# Patient Record
Sex: MALE | Race: WHITE
[De-identification: names, ages, dates, MRNs, and addresses within clinical notes are randomized per-mention and may not be internally consistent; named-entity substitution may affect disease eponyms.]

---

## 2020-10-31 ENCOUNTER — HOSPITAL ENCOUNTER (EMERGENCY)
Dept: HOSPITAL 62 - ER | Age: 60
Discharge: HOME | End: 2020-10-31
Payer: MEDICARE

## 2020-10-31 VITALS — SYSTOLIC BLOOD PRESSURE: 131 MMHG | DIASTOLIC BLOOD PRESSURE: 79 MMHG

## 2020-10-31 DIAGNOSIS — R50.9: ICD-10-CM

## 2020-10-31 DIAGNOSIS — Z20.828: ICD-10-CM

## 2020-10-31 DIAGNOSIS — R51.9: ICD-10-CM

## 2020-10-31 DIAGNOSIS — F17.200: ICD-10-CM

## 2020-10-31 DIAGNOSIS — M79.10: Primary | ICD-10-CM

## 2020-10-31 DIAGNOSIS — I10: ICD-10-CM

## 2020-10-31 LAB
A TYPE INFLUENZA AG: NEGATIVE
APPEARANCE UR: CLEAR
APTT PPP: YELLOW S
B INFLUENZA AG: NEGATIVE
BILIRUB UR QL STRIP: NEGATIVE
GLUCOSE UR STRIP-MCNC: NEGATIVE MG/DL
KETONES UR STRIP-MCNC: NEGATIVE MG/DL
NITRITE UR QL STRIP: NEGATIVE
PH UR STRIP: 5 [PH] (ref 5–9)
PROT UR STRIP-MCNC: NEGATIVE MG/DL
SP GR UR STRIP: 1.02
UROBILINOGEN UR-MCNC: NEGATIVE MG/DL (ref ?–2)

## 2020-10-31 PROCEDURE — 99284 EMERGENCY DEPT VISIT MOD MDM: CPT

## 2020-10-31 PROCEDURE — 87804 INFLUENZA ASSAY W/OPTIC: CPT

## 2020-10-31 PROCEDURE — C9803 HOPD COVID-19 SPEC COLLECT: HCPCS

## 2020-10-31 PROCEDURE — 81001 URINALYSIS AUTO W/SCOPE: CPT

## 2020-10-31 PROCEDURE — 87635 SARS-COV-2 COVID-19 AMP PRB: CPT

## 2020-10-31 PROCEDURE — 87070 CULTURE OTHR SPECIMN AEROBIC: CPT

## 2020-10-31 PROCEDURE — 87880 STREP A ASSAY W/OPTIC: CPT

## 2020-10-31 PROCEDURE — 71045 X-RAY EXAM CHEST 1 VIEW: CPT

## 2020-10-31 NOTE — RADIOLOGY REPORT (SQ)
EXAM DESCRIPTION: 



XR CHEST 1 VIEW



COMPLETED DATE/TME:  10/31/2020 19:27



CLINICAL HISTORY: 



60 years, Male, #1 SYNCOPE



COMPARISON:

None.



NUMBER OF VIEWS:

1



TECHNIQUE:

Portable AP upright view of the chest was obtained at 8:11 PM.



LIMITATIONS:

None.



FINDINGS:



Heart size is normal.  Lungs appear clear.  There is no evidence

of pleural effusion or pneumothorax.  No definite acute bony

abnormality is seen.



IMPRESSION:



No acute abnormality as above.

 



copyright 2011 [employer]- All Rights Reserved

## 2020-10-31 NOTE — ER DOCUMENT REPORT
ED Medical Screen (RME)





- General


Chief Complaint: Pain All Over


Stated Complaint: FEVER/MUSCLE PAINS/HEADACHE


Time Seen by Provider: 10/31/20 19:27


Mode of Arrival: Ambulatory


Information source: Patient


Notes: 





60-year-old male presented to ED for complaint of body aches all over that 

started yesterday then today he had a fever of over 101 at 4 PM took 4 Tylenol 

and got it down.  He is afebrile at this time.  He states he still has the body 

aches all over.  I have ordered flu strep chest x-ray urine and Covid test.











The patient was evaluated during the global Covid 19 pandemic, and that 

diagnosis was suspected/considered upon their initial presentation.  Their 

evaluation, treatment and testing was consistent with current guidelines for 

patients who present with complaints or symptoms that may be related to Covid 

19.

















I have greeted and performed a rapid initial assessment of this patient.  A 

comprehensive ED assessment and evaluation of the patient, analysis of test 

results and completion of medical decision making process will be conducted by 

an additional ED providers.

## 2020-10-31 NOTE — ER DOCUMENT REPORT
HPI





- HPI


Patient complains to provider of: Body aches


Time Seen by Provider: 10/31/20 19:27


Onset: Yesterday


Onset/Duration: Gradual


Quality of pain: Achy


Pain Level: 1


Context: 





Patient presents complaining of generalized body aches that started yesterday.  

Patient reports fever today of 101.  Patient does complain of mild headache 

pain.  Patient denies any cough.  Patient denies any sore throat.


Associated Symptoms: Body/muscle aches, Fever.  denies: Chest pain, 

Nonproductive cough, Productive cough, Earache, Nausea, Rhinnorhea


Exacerbated by: Denies


Relieved by: Denies


Similar symptoms previously: No


Recently seen / treated by doctor: No





- ROS


ROS below otherwise negative: Yes


Systems Reviewed and Negative: Yes All other systems reviewed and negative





- CONSTITUTIONAL


Constitutional: REPORTS: Fever.  DENIES: Chills





- EENT


EENT: DENIES: Sore Throat, Congestion





- NEURO


Neurology: DENIES: Headache





- CARDIOVASCULAR


Cardiovascular: DENIES: Chest pain





- RESPIRATORY


Respiratory: DENIES: Coughing





- GASTROINTESTINAL


Gastrointestinal: DENIES: Abdominal Pain, Nausea, Patient vomiting, Diarrhea





- MUSCULOSKELETAL


Notes: 





Generalized body aches





- DERM


Skin Color: Normal


Skin Problems: None





Past Medical History





- General


Information source: Patient





- Social History


Smoking Status: Current Every Day Smoker


Chew tobacco use (# tins/day): No


Frequency of alcohol use: Daily 3 beers


Drug Abuse: None


Occupation: None


Family History: Reviewed & Not Pertinent


Patient has homicidal ideation: No





- Past Medical History


Cardiac Medical History: Reports: Hx Hypertension


GI Medical History: Reports: Hx Gastroesophageal Reflux Disease


Past Surgical History: Reports: Hx Orthopedic Surgery - Multi back surg, c-spine





Vertical Provider Document





- CONSTITUTIONAL


Agree With Documented VS: Yes


Exam Limitations: No Limitations


General Appearance: WD/WN, No Apparent Distress


Notes: 





PHYSICAL EXAMINATION: 


GENERAL: Well-appearing and in no acute distress. 


HEAD: Atraumatic, normocephalic. 


EYES: sclera anicteric, conjunctiva are normal. 


ENT: nares patent. Moist mucous membranes. 


NECK: Normal range of motion, supple without lymphadenopathy, no meningismus


LUNGS: CTAB and equal. No wheezes rales or rhonchi. 


HEART: Regular rate and rhythm without murmurs 


EXTREMITIES: Normal range of motion, no pitting edema. 


BACK: No midline tenderness, no step-off or deformity. No CVA tenderness


NEUROLOGICAL: Cranial nerves grossly intact. Normal speech. Normal gait.


PSYCH: Normal mood, normal affect. 


SKIN: Warm, Dry, normal turgor, no rashes or lesions noted








Course





- Re-evaluation


Re-evalutation: 





10/31/20 21:23


Patient presents with generalized body aches that started yesterday with fever 

today.  Patient nontoxic in appearance and complains of tenderness that he 

describes as annoying although not severely painful to generalized muscles.  

Patient's strep and flu test negative at this time and no acute findings noted 

on chest x-ray, Covid test is pending at this time.  The patient was evaluated 

during the global Covid 19 pandemic, and that diagnosis was suspected/considered

upon their initial presentation.  Their evaluation, treatment and testing was 

consistent with current guidelines for patients who present with complaints or 

symptoms that may be related to Covid 19.





Patient presents with symptoms worrisome for possible Covid 19.  Patient does 

not have emergency worrying symptoms such as difficulty breathing, shortness of 

breath, chest pain, pressure, confusion or cyanosis.  Patient appears suitable 

for discharge as  vital signs are stable and patient is nontoxic in appearance. 

Good return precautions have been discussed with patient, patient verbalized 

understanding and is agreeable with discharge plan of care at this time.





- Vital Signs


Vital signs: 


                                        











Temp Pulse Resp BP Pulse Ox


 


 98.5 F   96   16   130/84 H  97 


 


 10/31/20 19:32  10/31/20 19:32  10/31/20 19:32  10/31/20 19:32  10/31/20 19:32














- Laboratory


Laboratory results interpreted by me: 


                                        











  10/31/20





  19:46


 


Urine Blood  MODERATE H











10/31/20 21:24


                              Labs- All tests 24 hr











  10/31/20 10/31/20 10/31/20





  19:46 19:46 19:46


 


Urine Color  YELLOW  


 


Urine Appearance  CLEAR  


 


Urine pH  5.0  


 


Ur Specific Gravity  1.017  


 


Urine Protein  NEGATIVE  


 


Urine Glucose (UA)  NEGATIVE  


 


Urine Ketones  NEGATIVE  


 


Urine Blood  MODERATE H  


 


Urine Nitrite  NEGATIVE  


 


Urine Bilirubin  NEGATIVE  


 


Urine Urobilinogen  NEGATIVE  


 


Ur Leukocyte Esterase  NEGATIVE  


 


Urine WBC (Auto)  1  


 


Urine RBC (Auto)  1  


 


Squamous Epi Cells Auto  <1  


 


Urine Mucus (Auto)  RARE  


 


Urine Ascorbic Acid  NEGATIVE  


 


COVID-19 Source   


 


Influenza A (Rapid)    NEGATIVE


 


Influenza B (Rapid)    NEGATIVE


 


Group A Strep Rapid   NEGATIVE 














  10/31/20





  19:46


 


Urine Color 


 


Urine Appearance 


 


Urine pH 


 


Ur Specific Gravity 


 


Urine Protein 


 


Urine Glucose (UA) 


 


Urine Ketones 


 


Urine Blood 


 


Urine Nitrite 


 


Urine Bilirubin 


 


Urine Urobilinogen 


 


Ur Leukocyte Esterase 


 


Urine WBC (Auto) 


 


Urine RBC (Auto) 


 


Squamous Epi Cells Auto 


 


Urine Mucus (Auto) 


 


Urine Ascorbic Acid 


 


COVID-19 Source  See comment


 


Influenza A (Rapid) 


 


Influenza B (Rapid) 


 


Group A Strep Rapid 














- Diagnostic Test


Radiology reviewed: Image reviewed, Reports reviewed





Discharge





- Discharge


Clinical Impression: 


 Encounter for screening laboratory testing for COVID-19 virus, Body aches





Headache


Qualifiers:


 Headache type: unspecified Headache chronicity pattern: unspecified pattern 

Intractability: not intractable Qualified Code(s): R51.9 - Headache, unspecified





Condition: Stable


Disposition: HOME, SELF-CARE


Instructions:  COVID-19 Guidance for Persons Under Investigation, Acetaminophen,

Headache ([facility]), Myalagia (Muscle Pain) ([facility])


Additional Instructions: 


Return immediately for any new or worsening symptoms: Persistent fever, 

worsening headache, any concerning new symptoms





Followup with your primary care provider, call tomorrow to make a followup 

appointment





Covid test is pending at this time, home quarantine until you receive results








Referrals: 


DEBBIE HANCOCK PA-C [Primary Care Provider] - Follow up as needed